# Patient Record
Sex: FEMALE | Race: WHITE | Employment: OTHER | URBAN - METROPOLITAN AREA
[De-identification: names, ages, dates, MRNs, and addresses within clinical notes are randomized per-mention and may not be internally consistent; named-entity substitution may affect disease eponyms.]

---

## 2018-01-05 ENCOUNTER — OFFICE VISIT (OUTPATIENT)
Dept: INTERNAL MEDICINE CLINIC | Facility: CLINIC | Age: 38
End: 2018-01-05

## 2018-01-05 ENCOUNTER — LAB ENCOUNTER (OUTPATIENT)
Dept: LAB | Age: 38
End: 2018-01-05
Attending: NURSE PRACTITIONER
Payer: COMMERCIAL

## 2018-01-05 VITALS
HEART RATE: 72 BPM | BODY MASS INDEX: 21.59 KG/M2 | HEIGHT: 64.5 IN | WEIGHT: 128 LBS | TEMPERATURE: 97 F | SYSTOLIC BLOOD PRESSURE: 116 MMHG | DIASTOLIC BLOOD PRESSURE: 62 MMHG

## 2018-01-05 DIAGNOSIS — D64.9 ANEMIA, UNSPECIFIED TYPE: ICD-10-CM

## 2018-01-05 DIAGNOSIS — D64.9 ANEMIA, UNSPECIFIED TYPE: Primary | ICD-10-CM

## 2018-01-05 DIAGNOSIS — E03.9 ACQUIRED HYPOTHYROIDISM: ICD-10-CM

## 2018-01-05 DIAGNOSIS — Z00.00 LABORATORY EXAMINATION ORDERED AS PART OF A COMPLETE PHYSICAL EXAMINATION: ICD-10-CM

## 2018-01-05 DIAGNOSIS — Z12.31 ENCOUNTER FOR SCREENING MAMMOGRAM FOR MALIGNANT NEOPLASM OF BREAST: Primary | ICD-10-CM

## 2018-01-05 DIAGNOSIS — R53.83 FATIGUE, UNSPECIFIED TYPE: ICD-10-CM

## 2018-01-05 DIAGNOSIS — Z00.00 ROUTINE GENERAL MEDICAL EXAMINATION AT A HEALTH CARE FACILITY: ICD-10-CM

## 2018-01-05 DIAGNOSIS — F41.9 ANXIETY: ICD-10-CM

## 2018-01-05 DIAGNOSIS — M54.2 NECK PAIN: ICD-10-CM

## 2018-01-05 LAB
ALBUMIN SERPL-MCNC: 3.7 G/DL (ref 3.5–4.8)
ALP LIVER SERPL-CCNC: 49 U/L (ref 37–98)
ALT SERPL-CCNC: 31 U/L (ref 14–54)
AST SERPL-CCNC: 21 U/L (ref 15–41)
BASOPHILS # BLD AUTO: 0.04 X10(3) UL (ref 0–0.1)
BASOPHILS NFR BLD AUTO: 0.9 %
BILIRUB SERPL-MCNC: 0.5 MG/DL (ref 0.1–2)
BUN BLD-MCNC: 7 MG/DL (ref 8–20)
CALCIUM BLD-MCNC: 8.6 MG/DL (ref 8.3–10.3)
CHLORIDE: 103 MMOL/L (ref 101–111)
CHOLEST SMN-MCNC: 175 MG/DL (ref ?–200)
CO2: 30 MMOL/L (ref 22–32)
CREAT BLD-MCNC: 0.72 MG/DL (ref 0.55–1.02)
DEPRECATED HBV CORE AB SER IA-ACNC: 18.3 NG/ML (ref 10–291)
EOSINOPHIL # BLD AUTO: 0.06 X10(3) UL (ref 0–0.3)
EOSINOPHIL NFR BLD AUTO: 1.4 %
ERYTHROCYTE [DISTWIDTH] IN BLOOD BY AUTOMATED COUNT: 13.3 % (ref 11.5–16)
FREE T4: 1.4 NG/DL (ref 0.9–1.8)
GLUCOSE BLD-MCNC: 82 MG/DL (ref 70–99)
HAV AB SERPL IA-ACNC: 714 PG/ML (ref 193–986)
HCT VFR BLD AUTO: 37 % (ref 34–50)
HDLC SERPL-MCNC: 72 MG/DL (ref 45–?)
HDLC SERPL: 2.43 {RATIO} (ref ?–4.44)
HGB BLD-MCNC: 11.8 G/DL (ref 12–16)
IMMATURE GRANULOCYTE COUNT: 0.01 X10(3) UL (ref 0–1)
IMMATURE GRANULOCYTE RATIO %: 0.2 %
IRON SATURATION: 19 % (ref 13–45)
IRON: 72 UG/DL (ref 28–170)
LDLC SERPL CALC-MCNC: 88 MG/DL (ref ?–130)
LYMPHOCYTES # BLD AUTO: 1.42 X10(3) UL (ref 0.9–4)
LYMPHOCYTES NFR BLD AUTO: 32.9 %
M PROTEIN MFR SERPL ELPH: 7 G/DL (ref 6.1–8.3)
MCH RBC QN AUTO: 30.6 PG (ref 27–33.2)
MCHC RBC AUTO-ENTMCNC: 31.9 G/DL (ref 31–37)
MCV RBC AUTO: 96.1 FL (ref 81–100)
MONOCYTES # BLD AUTO: 0.51 X10(3) UL (ref 0.1–0.6)
MONOCYTES NFR BLD AUTO: 11.8 %
NEUTROPHIL ABS PRELIM: 2.27 X10 (3) UL (ref 1.3–6.7)
NEUTROPHILS # BLD AUTO: 2.27 X10(3) UL (ref 1.3–6.7)
NEUTROPHILS NFR BLD AUTO: 52.8 %
NONHDLC SERPL-MCNC: 103 MG/DL (ref ?–130)
PLATELET # BLD AUTO: 223 10(3)UL (ref 150–450)
POTASSIUM SERPL-SCNC: 4.2 MMOL/L (ref 3.6–5.1)
RBC # BLD AUTO: 3.85 X10(6)UL (ref 3.8–5.1)
RED CELL DISTRIBUTION WIDTH-SD: 47.2 FL (ref 35.1–46.3)
SODIUM SERPL-SCNC: 139 MMOL/L (ref 136–144)
TOTAL IRON BINDING CAPACITY: 386 UG/DL (ref 298–536)
TRANSFERRIN: 259 MG/DL (ref 200–360)
TRIGL SERPL-MCNC: 75 MG/DL (ref ?–150)
TSI SER-ACNC: 4.32 MIU/ML (ref 0.35–5.5)
VLDLC SERPL CALC-MCNC: 15 MG/DL (ref 5–40)
WBC # BLD AUTO: 4.3 X10(3) UL (ref 4–13)

## 2018-01-05 PROCEDURE — 84482 T3 REVERSE: CPT | Performed by: NURSE PRACTITIONER

## 2018-01-05 PROCEDURE — 83540 ASSAY OF IRON: CPT | Performed by: NURSE PRACTITIONER

## 2018-01-05 PROCEDURE — 83550 IRON BINDING TEST: CPT | Performed by: NURSE PRACTITIONER

## 2018-01-05 PROCEDURE — 80050 GENERAL HEALTH PANEL: CPT | Performed by: NURSE PRACTITIONER

## 2018-01-05 PROCEDURE — 99385 PREV VISIT NEW AGE 18-39: CPT | Performed by: NURSE PRACTITIONER

## 2018-01-05 PROCEDURE — 84439 ASSAY OF FREE THYROXINE: CPT | Performed by: NURSE PRACTITIONER

## 2018-01-05 PROCEDURE — 82607 VITAMIN B-12: CPT | Performed by: NURSE PRACTITIONER

## 2018-01-05 PROCEDURE — 80061 LIPID PANEL: CPT | Performed by: NURSE PRACTITIONER

## 2018-01-05 PROCEDURE — 82728 ASSAY OF FERRITIN: CPT | Performed by: NURSE PRACTITIONER

## 2018-01-05 RX ORDER — LEVOTHYROXINE SODIUM 88 UG/1
88 TABLET ORAL
Qty: 30 TABLET | Refills: 2 | Status: SHIPPED | OUTPATIENT
Start: 2018-01-05 | End: 2018-01-26

## 2018-01-05 RX ORDER — LEVOTHYROXINE SODIUM 88 UG/1
TABLET ORAL
Refills: 0 | COMMUNITY
Start: 2018-01-04 | End: 2018-03-29

## 2018-01-05 RX ORDER — ESCITALOPRAM OXALATE 10 MG/1
10 TABLET ORAL EVERY OTHER DAY
COMMUNITY
End: 2018-03-29

## 2018-01-05 NOTE — PROGRESS NOTES
Elaine Alvarez is a 40year old female who presents for a complete physical exam:       Patient complains of:    Hypothyroid: labs were done in October  Levothyroxine 88mcg 5 days per week and 44mcg 2 days per week.   Her TSH on last labs was overreplace Colonoscopy: na  Pap: Oct 2016  Hx of fibroids. [address]      History of dysplasia:  No   Menstrual Cycle: regular         LMP: she was on birth control for dysmenorrhea.   Stopped 2 months ago  Last Dec 22  Symptoms: periods are regular  Sexually Activ Labs today   Baseline screening mammogram ordered.       Encounter for screening mammogram for malignant neoplasm of breast  (primary encounter diagnosis)  Routine general medical examination at a health care facility  Acquired hypothyroidism  Cont levothyr

## 2018-01-07 DIAGNOSIS — D64.9 ANEMIA, UNSPECIFIED TYPE: Primary | ICD-10-CM

## 2018-01-26 ENCOUNTER — TELEPHONE (OUTPATIENT)
Dept: INTERNAL MEDICINE CLINIC | Facility: CLINIC | Age: 38
End: 2018-01-26

## 2018-01-26 RX ORDER — LEVOTHYROXINE SODIUM 88 UG/1
88 TABLET ORAL
Qty: 90 TABLET | Refills: 0 | Status: SHIPPED | OUTPATIENT
Start: 2018-01-26 | End: 2018-03-29

## 2018-01-26 NOTE — TELEPHONE ENCOUNTER
Called and spoke with Dixie in \"send out's\" stating unsure why reverse T3 taking this long. Will contact reference lab to further investigate and call back with further info.   Hold for response

## 2018-01-26 NOTE — TELEPHONE ENCOUNTER
[phone]  Lm for pt (Ivanna Gonzalez per HIPAA) to inform lab advised test will be delayed until 1/29 due to behind on ordering kit. 90 day supply of Levothyroxine Sodium 88 MCG sent to [address] listed as requested.  To call back at the office if any furth

## 2018-01-26 NOTE — TELEPHONE ENCOUNTER
Pt no longer wishes to be part of our clinic. Does not want TB as PCP or SD as part of her care.  Has established care and will begin there in March of 2018

## 2018-01-26 NOTE — TELEPHONE ENCOUNTER
Patient called indicating thyroid medication never sent to pharmacy, records indicate that rx was sent on 1/5/18 for 30 tablets with two refills, pt also inquiring about outstanding thyroid lab (still showing in process).       Pt did state that she is esta

## 2018-01-26 NOTE — TELEPHONE ENCOUNTER
Pt called is asking for 60 days of Levothyroxine Sodium 88 MCG. .. She will be establishing care else where in March and needs something until then. She does not wish to continue care with anyone in our office.      Pls FU with her lab result for a reverse T

## 2018-01-26 NOTE — TELEPHONE ENCOUNTER
Collette Becerra from the lab called and said test will be delayed until the 29th, they are behind on ordering a kit.  [phone]

## 2018-01-27 LAB — TRIIODOTHYRONINE, REVERSE: 18.9 NG/DL

## 2018-03-29 PROCEDURE — 87510 GARDNER VAG DNA DIR PROBE: CPT | Performed by: OBSTETRICS & GYNECOLOGY

## 2018-03-29 PROCEDURE — 87480 CANDIDA DNA DIR PROBE: CPT | Performed by: OBSTETRICS & GYNECOLOGY

## 2018-03-29 PROCEDURE — 87660 TRICHOMONAS VAGIN DIR PROBE: CPT | Performed by: OBSTETRICS & GYNECOLOGY

## 2018-05-30 PROCEDURE — 87624 HPV HI-RISK TYP POOLED RSLT: CPT | Performed by: OBSTETRICS & GYNECOLOGY

## 2018-05-30 PROCEDURE — 88175 CYTOPATH C/V AUTO FLUID REDO: CPT | Performed by: OBSTETRICS & GYNECOLOGY

## 2019-06-03 ENCOUNTER — APPOINTMENT (OUTPATIENT)
Dept: GENERAL RADIOLOGY | Age: 39
End: 2019-06-03
Attending: EMERGENCY MEDICINE
Payer: COMMERCIAL

## 2019-06-03 ENCOUNTER — HOSPITAL ENCOUNTER (EMERGENCY)
Age: 39
Discharge: HOME | End: 2019-06-03
Attending: EMERGENCY MEDICINE
Payer: COMMERCIAL

## 2019-06-03 VITALS
SYSTOLIC BLOOD PRESSURE: 102 MMHG | HEIGHT: 64 IN | HEART RATE: 80 BPM | WEIGHT: 124 LBS | DIASTOLIC BLOOD PRESSURE: 63 MMHG | BODY MASS INDEX: 21.17 KG/M2 | OXYGEN SATURATION: 98 % | TEMPERATURE: 98 F | RESPIRATION RATE: 25 BRPM

## 2019-06-03 DIAGNOSIS — R07.89 CHEST PAIN, ATYPICAL: Primary | ICD-10-CM

## 2019-06-03 PROCEDURE — 85378 FIBRIN DEGRADE SEMIQUANT: CPT | Performed by: EMERGENCY MEDICINE

## 2019-06-03 PROCEDURE — 85025 COMPLETE CBC W/AUTO DIFF WBC: CPT | Performed by: EMERGENCY MEDICINE

## 2019-06-03 PROCEDURE — 85730 THROMBOPLASTIN TIME PARTIAL: CPT | Performed by: EMERGENCY MEDICINE

## 2019-06-03 PROCEDURE — 93005 ELECTROCARDIOGRAM TRACING: CPT

## 2019-06-03 PROCEDURE — 96360 HYDRATION IV INFUSION INIT: CPT

## 2019-06-03 PROCEDURE — 99284 EMERGENCY DEPT VISIT MOD MDM: CPT

## 2019-06-03 PROCEDURE — 71045 X-RAY EXAM CHEST 1 VIEW: CPT | Performed by: EMERGENCY MEDICINE

## 2019-06-03 PROCEDURE — 80053 COMPREHEN METABOLIC PANEL: CPT | Performed by: EMERGENCY MEDICINE

## 2019-06-03 PROCEDURE — 93010 ELECTROCARDIOGRAM REPORT: CPT

## 2019-06-03 PROCEDURE — 84484 ASSAY OF TROPONIN QUANT: CPT | Performed by: EMERGENCY MEDICINE

## 2019-06-03 PROCEDURE — 99285 EMERGENCY DEPT VISIT HI MDM: CPT

## 2019-06-03 PROCEDURE — 85610 PROTHROMBIN TIME: CPT | Performed by: EMERGENCY MEDICINE

## 2019-06-03 RX ORDER — SODIUM CHLORIDE 9 MG/ML
INJECTION, SOLUTION INTRAVENOUS CONTINUOUS
Status: DISCONTINUED | OUTPATIENT
Start: 2019-06-03 | End: 2019-06-03

## 2019-06-03 RX ORDER — LEVOTHYROXINE SODIUM 75 UG/1
CAPSULE ORAL
COMMUNITY
End: 2020-01-10

## 2019-06-03 RX ORDER — MULTIVIT-MIN/IRON FUM/FOLIC AC 7.5 MG-4
1 TABLET ORAL DAILY
COMMUNITY

## 2019-06-03 NOTE — ED PROVIDER NOTES
Patient Seen in: THE [facility] Emergency Department In [place]    History   Patient presents with:  Chest Pain Angina (cardiovascular)    Stated Complaint: chest pain    HPI    Patient is a pleasant 80-year-old female, presenting for evaluation of left-sided gallops. ABDOMEN: The abdomen is soft, nondistended, nontender. Bowel sounds present. SKIN: Skin is pink warm and dry. There are no rashes. EXTREMITIES: The patient moves all 4 extremities freely. No cyanosis, clubbing, or edema.    NEUROLOGIC: The pa RAINBOW DRAW LIGHT GREEN   RAINBOW DRAW GOLD   CBC W/ DIFFERENTIAL     EKG: The EKG revealed rate, intervals, and axis as noted on the EKG report. I have reviewed and agree with these readings. Sinus rhythm at 84 bpm.  No acute ST segment changes.      Billye Nails incident.       Disposition and Plan     Clinical Impression:  Chest pain, atypical  (primary encounter diagnosis)    Disposition:  Discharge  6/3/2019  2:17 pm    Follow-up:  Gabriela Mejia  [address] [place] [address] [phone]

## 2019-06-03 NOTE — ED INITIAL ASSESSMENT (HPI)
Patient c/o tingling to left side of neck that radiates down left arm and to upper back starting last night. Took one baby aspirin 2 hours ago. Pain worsens with deep breath. Pain decreases when lying down.

## 2019-10-03 ENCOUNTER — OFFICE VISIT (OUTPATIENT)
Dept: INTEGRATIVE MEDICINE | Facility: CLINIC | Age: 39
End: 2019-10-03
Payer: COMMERCIAL

## 2019-10-03 VITALS
WEIGHT: 129.81 LBS | DIASTOLIC BLOOD PRESSURE: 48 MMHG | SYSTOLIC BLOOD PRESSURE: 90 MMHG | TEMPERATURE: 98 F | HEIGHT: 64 IN | HEART RATE: 79 BPM | BODY MASS INDEX: 22.16 KG/M2 | OXYGEN SATURATION: 95 %

## 2019-10-03 DIAGNOSIS — R19.8 CHANGE IN BOWEL FUNCTION: Primary | ICD-10-CM

## 2019-10-03 DIAGNOSIS — F41.9 ANXIETY: ICD-10-CM

## 2019-10-03 PROCEDURE — 99204 OFFICE O/P NEW MOD 45 MIN: CPT | Performed by: PHYSICIAN ASSISTANT

## 2019-10-03 RX ORDER — BUPROPION HYDROCHLORIDE 75 MG/1
75 TABLET ORAL 2 TIMES DAILY
Qty: 90 TABLET | Refills: 0 | Status: SHIPPED | OUTPATIENT
Start: 2019-10-03 | End: 2019-10-03

## 2019-10-03 RX ORDER — BUPROPION HYDROCHLORIDE 75 MG/1
TABLET ORAL
Qty: 180 TABLET | Refills: 0 | Status: SHIPPED | OUTPATIENT
Start: 2019-10-03 | End: 2020-01-06

## 2019-10-03 RX ORDER — NALTREXONE HYDROCHLORIDE 50 MG/1
3 TABLET, FILM COATED ORAL DAILY
COMMUNITY
End: 2021-10-04

## 2019-10-03 NOTE — PROGRESS NOTES
Lobito Michel is a 44year old female. Patient presents with:  Establish Care: thyroid , anxiety, \" twitchy\"  hands   Physical      HPI:   Recommended. Lots of deaths and divorce. Lived out of state for a couple years. Does not sleep well.  Lots of Family History   Problem Relation Age of Onset   • Heart Attack Father    • Hypertension Father    • Thyroid Disorder Mother    • Hypertension Maternal Grandmother    • High Cholesterol Maternal Grandmother    • Heart Attack Maternal Grandmother    • Cance Smoking status: Never Smoker      Smokeless tobacco: Never Used    Substance and Sexual Activity      Alcohol use:  Yes        Alcohol/week: 1.0 standard drinks        Types: 1 Standard drinks or equivalent per week        Comment: Cage done 1/5/18 Pulmonary/Chest: Effort normal and breath sounds normal.   Abdominal: Soft. Bowel sounds are normal. There is no tenderness. There is no guarding. Lymphadenopathy:     She has no cervical adenopathy.    Neurological: She is alert and oriented to person, p Start taking 1/8 tsp daily and double the dose every 7 days until taking 1 tsp three times daily. Mix in liquid of choice. Reduce to lowest dose tolerated if any reactions occur. Buy it online at  [email or web address] or at the [address]

## 2019-10-03 NOTE — PATIENT INSTRUCTIONS
Nanoboost + (hemp oil with terpenes)      Directions: 3 drops under the tongue three times per day  Where:  [email or web address]  Note: it may take up to 2 months to see full benefit for your system.      Restore  A liquid supplement f

## 2020-01-04 DIAGNOSIS — F41.9 ANXIETY: ICD-10-CM

## 2020-01-06 RX ORDER — BUPROPION HYDROCHLORIDE 75 MG/1
TABLET ORAL
Qty: 180 TABLET | Refills: 0 | Status: SHIPPED | OUTPATIENT
Start: 2020-01-06 | End: 2020-01-10

## 2020-01-06 NOTE — TELEPHONE ENCOUNTER
A refill request was received for:  Requested Prescriptions     Pending Prescriptions Disp Refills   • BUPROPION HCL 75 MG Oral Tab [Pharmacy Med Name: BUPROPION 75MG TABLETS] 180 tablet 0     Sig: TAKE 1 TABLET(75 MG) BY MOUTH TWICE DAILY     Last refill

## 2020-01-10 ENCOUNTER — OFFICE VISIT (OUTPATIENT)
Dept: INTEGRATIVE MEDICINE | Facility: CLINIC | Age: 40
End: 2020-01-10
Payer: COMMERCIAL

## 2020-01-10 VITALS
DIASTOLIC BLOOD PRESSURE: 68 MMHG | SYSTOLIC BLOOD PRESSURE: 104 MMHG | OXYGEN SATURATION: 98 % | BODY MASS INDEX: 21.62 KG/M2 | WEIGHT: 126.63 LBS | TEMPERATURE: 98 F | HEIGHT: 64 IN | HEART RATE: 103 BPM

## 2020-01-10 DIAGNOSIS — F41.9 ANXIETY: ICD-10-CM

## 2020-01-10 DIAGNOSIS — E03.9 ACQUIRED HYPOTHYROIDISM: Primary | ICD-10-CM

## 2020-01-10 PROCEDURE — 99214 OFFICE O/P EST MOD 30 MIN: CPT | Performed by: PHYSICIAN ASSISTANT

## 2020-01-10 RX ORDER — BUPROPION HYDROCHLORIDE 75 MG/1
TABLET ORAL
Qty: 90 TABLET | Refills: 0 | Status: SHIPPED | OUTPATIENT
Start: 2020-01-10 | End: 2020-04-02

## 2020-01-10 RX ORDER — LEVOTHYROXINE SODIUM 50 UG/1
1 CAPSULE ORAL
Qty: 90 CAPSULE | Refills: 0 | Status: SHIPPED | OUTPATIENT
Start: 2020-01-10 | End: 2020-02-09

## 2020-01-10 NOTE — PATIENT INSTRUCTIONS
Vitamin D/K2 by Quang Sexton    Directions: 12 drops in liquid daily  Where to Find: www.giuliana. com  Why: Vitamin D/K2 Liquid is a convenient way to supplement with both vitamins D and K2, which offer greater support for bones and the immune and cardiov
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
## 2020-02-26 DIAGNOSIS — E03.9 ACQUIRED HYPOTHYROIDISM: ICD-10-CM

## 2020-02-26 RX ORDER — LEVOTHYROXINE SODIUM 50 UG/1
CAPSULE ORAL
Qty: 90 CAPSULE | Refills: 0 | OUTPATIENT
Start: 2020-02-26

## 2020-02-28 DIAGNOSIS — N76.0 VAGINITIS AND VULVOVAGINITIS: ICD-10-CM

## 2020-02-28 RX ORDER — LIOTHYRONINE SODIUM 5 UG/1
5 TABLET ORAL DAILY
Qty: 90 TABLET | Refills: 0 | Status: SHIPPED | OUTPATIENT
Start: 2020-02-28 | End: 2020-04-03

## 2020-02-28 NOTE — TELEPHONE ENCOUNTER
A refill request was received for:  Requested Prescriptions     Pending Prescriptions Disp Refills   • Liothyronine Sodium 5 MCG Oral Tab 90 tablet 0     Sig: Take 1 tablet (5 mcg total) by mouth daily.  25mcg in the morning and 15mcg in the evening     Las

## 2020-04-01 DIAGNOSIS — F41.9 ANXIETY: ICD-10-CM

## 2020-04-02 RX ORDER — BUPROPION HYDROCHLORIDE 75 MG/1
TABLET ORAL
Qty: 180 TABLET | Refills: 0 | Status: SHIPPED | OUTPATIENT
Start: 2020-04-02 | End: 2020-11-16

## 2020-04-03 ENCOUNTER — TELEPHONE (OUTPATIENT)
Dept: INTEGRATIVE MEDICINE | Facility: CLINIC | Age: 40
End: 2020-04-03

## 2020-04-03 DIAGNOSIS — N76.0 VAGINITIS AND VULVOVAGINITIS: ICD-10-CM

## 2020-04-03 RX ORDER — LIOTHYRONINE SODIUM 5 UG/1
5 TABLET ORAL DAILY
Qty: 90 TABLET | Refills: 0 | Status: SHIPPED | OUTPATIENT
Start: 2020-04-03 | End: 2020-05-26

## 2020-04-03 NOTE — TELEPHONE ENCOUNTER
A refill request was received for:  Requested Prescriptions      No prescriptions requested or ordered in this encounter     Last refill date: 02/28/2020  Qty:90 tabs  Last office visit: 01/10/2020  When is follow up due: 03/10/2020    No future appointmen

## 2020-04-06 ENCOUNTER — TELEPHONE (OUTPATIENT)
Dept: INTEGRATIVE MEDICINE | Facility: CLINIC | Age: 40
End: 2020-04-06

## 2020-04-06 ENCOUNTER — VIRTUAL PHONE E/M (OUTPATIENT)
Dept: INTEGRATIVE MEDICINE | Facility: CLINIC | Age: 40
End: 2020-04-06
Payer: COMMERCIAL

## 2020-04-06 DIAGNOSIS — E03.9 ACQUIRED HYPOTHYROIDISM: Primary | ICD-10-CM

## 2020-04-06 DIAGNOSIS — F41.9 ANXIETY: ICD-10-CM

## 2020-04-06 PROCEDURE — 99213 OFFICE O/P EST LOW 20 MIN: CPT | Performed by: PHYSICIAN ASSISTANT

## 2020-04-06 RX ORDER — LIOTHYRONINE SODIUM 25 UG/1
25 TABLET ORAL DAILY
Qty: 90 TABLET | Refills: 0 | Status: SHIPPED | OUTPATIENT
Start: 2020-04-06 | End: 2020-04-06

## 2020-04-06 RX ORDER — LIOTHYRONINE SODIUM 25 UG/1
25 TABLET ORAL DAILY
Qty: 90 TABLET | Refills: 0 | Status: SHIPPED | OUTPATIENT
Start: 2020-04-06 | End: 2020-06-05

## 2020-04-06 NOTE — TELEPHONE ENCOUNTER
Ruben called and they need clarification of how Liothyrione is to be dosed. One order states once a day and another states three times a day.      Please clarify with pharmacisit [phone]    Thank you

## 2020-04-06 NOTE — PROGRESS NOTES
Virtual/Telephone Check-In    Yolie Villarreal verbally consents to a [employer] on 04/06/20. Patient understands and accepts financial responsibility for any deductible, co-insurance and/or co-pays associated with this service.

## 2020-04-06 NOTE — TELEPHONE ENCOUNTER
Can you please call the pharmacy to clarify the order you made this am? We are playing phone tag, I confirmed the 25 mg once a day dosing for this patient.

## 2020-04-06 NOTE — TELEPHONE ENCOUNTER
Today script was liothyronine 25 mcg tablet. Take daily. Prescribe 90. Patient has another 5 mcg script that was filled on 4/3/20. This should be 5 mcg Take three daily. 90 day supply.

## 2020-04-06 NOTE — TELEPHONE ENCOUNTER
Pharmacy called regarding this rx and is confused about recommendations after your voicemailDane. Dane could you please follow up with Evon Tirado at [phone].

## 2020-04-07 ENCOUNTER — TELEPHONE (OUTPATIENT)
Dept: INTEGRATIVE MEDICINE | Facility: CLINIC | Age: 40
End: 2020-04-07

## 2020-04-07 NOTE — TELEPHONE ENCOUNTER
Please call pharmacy to clarify medication questions that they have regarding dosing made by [facility] yesterday. They are still not clear. Thank you.

## 2020-04-08 ENCOUNTER — TELEPHONE (OUTPATIENT)
Dept: INTEGRATIVE MEDICINE | Facility: CLINIC | Age: 40
End: 2020-04-08

## 2020-04-08 NOTE — TELEPHONE ENCOUNTER
Patient called wanted to change medication LDN, need same dosage but scored tablets. Pharmacy  have not mailed it out yet, so you should be free to change.

## 2020-04-21 RX ORDER — LEVOTHYROXINE SODIUM 50 UG/1
CAPSULE ORAL
COMMUNITY
Start: 2020-03-17 | End: 2020-05-11

## 2020-05-11 DIAGNOSIS — E03.9 ACQUIRED HYPOTHYROIDISM: Primary | ICD-10-CM

## 2020-05-11 RX ORDER — LEVOTHYROXINE SODIUM 50 UG/1
CAPSULE ORAL
Qty: 90 CAPSULE | Refills: 0 | Status: SHIPPED | OUTPATIENT
Start: 2020-05-11 | End: 2020-10-16

## 2020-05-13 ENCOUNTER — TELEPHONE (OUTPATIENT)
Dept: INTEGRATIVE MEDICINE | Facility: CLINIC | Age: 40
End: 2020-05-13

## 2020-05-22 ENCOUNTER — TELEPHONE (OUTPATIENT)
Dept: INTEGRATIVE MEDICINE | Facility: CLINIC | Age: 40
End: 2020-05-22

## 2020-05-22 DIAGNOSIS — Z71.89 ADVICE GIVEN ABOUT COVID-19 VIRUS INFECTION: Primary | ICD-10-CM

## 2020-05-22 NOTE — TELEPHONE ENCOUNTER
Patient called today asking to have blood work order sent to [facility], would like to get an antibody test too and also needs to know when she can go get blood work.   [facility] # [phone]

## 2020-05-26 ENCOUNTER — TELEPHONE (OUTPATIENT)
Dept: INTEGRATIVE MEDICINE | Facility: CLINIC | Age: 40
End: 2020-05-26

## 2020-05-26 DIAGNOSIS — N76.0 VAGINITIS AND VULVOVAGINITIS: ICD-10-CM

## 2020-05-26 RX ORDER — LIOTHYRONINE SODIUM 5 UG/1
TABLET ORAL
Qty: 270 TABLET | Refills: 0 | Status: SHIPPED | OUTPATIENT
Start: 2020-05-26 | End: 2020-09-28

## 2020-05-26 NOTE — TELEPHONE ENCOUNTER
Orders have been faxed to [facility]. lvm informing pt to call office back if she has any questions or concerns

## 2020-05-26 NOTE — TELEPHONE ENCOUNTER
Patient is waiting for blood work to be taken [facility], in the [facility]. They told her they do not have the order.

## 2020-05-29 LAB
SARS COV 2 AB IGG: NEGATIVE
T3 REVERSE, /MS/MS: 7 NG/DL (ref 8–25)
T3, FREE: 2.8 PG/ML (ref 2.3–4.2)
T4, FREE: 0.7 NG/DL (ref 0.8–1.8)
TSH: 0.01 MIU/L
ZINC: 95 MCG/DL (ref 60–130)

## 2020-06-01 NOTE — TELEPHONE ENCOUNTER
Tito Devries,     Your COVID antibody is negative meaning that you most likely have not been exposed to the virus. This test is still experimental at this time. Your zinc levles look great. Your TSH is still a little suppressed.  Your T3 levels look

## 2020-06-02 ENCOUNTER — TELEPHONE (OUTPATIENT)
Dept: INTEGRATIVE MEDICINE | Facility: CLINIC | Age: 40
End: 2020-06-02

## 2020-06-02 NOTE — TELEPHONE ENCOUNTER
Patient called and would like to review latest labs, has been feeling tired as well as excessive hunger.  Patient has a VV for Friday with Neno Ceferino at 10:30 am

## 2020-06-05 ENCOUNTER — TELEMEDICINE (OUTPATIENT)
Dept: INTEGRATIVE MEDICINE | Facility: CLINIC | Age: 40
End: 2020-06-05

## 2020-06-05 DIAGNOSIS — R68.89 EXCESSIVE SELENIUM INTAKE: ICD-10-CM

## 2020-06-05 DIAGNOSIS — E03.9 ACQUIRED HYPOTHYROIDISM: Primary | ICD-10-CM

## 2020-06-05 DIAGNOSIS — R53.82 CHRONIC FATIGUE: ICD-10-CM

## 2020-06-05 PROCEDURE — 99214 OFFICE O/P EST MOD 30 MIN: CPT | Performed by: PHYSICIAN ASSISTANT

## 2020-06-05 RX ORDER — LIOTHYRONINE SODIUM 5 UG/1
5 TABLET ORAL DAILY
Qty: 270 TABLET | Refills: 0 | Status: SHIPPED | OUTPATIENT
Start: 2020-06-05 | End: 2020-09-29

## 2020-06-05 NOTE — PROGRESS NOTES
Robles Martinez is a 36year old female. Patient presents with:  Lab Results: Thyroid follow up  Fatigue      HPI:   Patient presents to follow up on thyroid and go over lab results. She is Very hungry all the time and has headaches.  Still very fatigu MCG Oral Tab Take 1 tablet (5 mcg total) by mouth daily.  270 tablet 0   • LIOTHYRONINE SODIUM 5 MCG Oral Tab TAKE 3 TABLETS BY MOUTH IN THE EVENING 270 tablet 0   • TIROSINT 50 MCG Oral Cap TAKE 1 CAPSULE BY MOUTH BEFORE BREAKFAST 90 capsule 0   • BUPROPIO partner: Not on file        Emotionally abused: Not on file        Physically abused: Not on file        Forced sexual activity: Not on file    Other Topics      Concerns:        Not on file    Social History Narrative      Not on file      SURGICAL HISTOR EBVCA(IGG/M)    3. Excessive selenium intake  - Liothyronine Sodium 5 MCG Oral Tab; Take 1 tablet (5 mcg total) by mouth daily. Dispense: 270 tablet; Refill: 0  - THYROID PEROXIDASE (TPO) AB; Future  - FREE T4 (FREE THYROXINE);  Future  - FREE T3 (TRIIODOT

## 2020-07-03 ENCOUNTER — TELEPHONE (OUTPATIENT)
Dept: FAMILY MEDICINE CLINIC | Facility: CLINIC | Age: 40
End: 2020-07-03

## 2020-07-03 DIAGNOSIS — E03.9 ACQUIRED HYPOTHYROIDISM: Primary | ICD-10-CM

## 2020-07-03 NOTE — TELEPHONE ENCOUNTER
Pt called regarding rx question for Liothronine. Pts pharmacy has taking rx 1x per day.  Pt stated she is supposed to take 4x in am and 2x in pm.

## 2020-07-06 RX ORDER — LIOTHYRONINE SODIUM 5 UG/1
TABLET ORAL
Qty: 540 TABLET | Refills: 0 | Status: SHIPPED | OUTPATIENT
Start: 2020-07-06 | End: 2020-09-10

## 2020-07-26 LAB
EPSTEIN BARR VIRUS (EBNA)$ANTIBODY (IGG): 324 U/ML
EPSTEIN BARR VIRUS VCA$ANTIBODY (IGG): 224 U/ML
EPSTEIN BARR VIRUS VCA$ANTIBODY (IGM): <36 U/ML
SELENIUM: 138 MCG/L (ref 63–160)
T3 REVERSE, /MS/MS: 7 NG/DL (ref 8–25)
T3, FREE: 2.5 PG/ML (ref 2.3–4.2)
T4, FREE: 0.7 NG/DL (ref 0.8–1.8)
THYROGLOBULIN ANTIBODIES: 12 IU/ML
THYROID PEROXIDASE$ANTIBODIES: 25 IU/ML
TSH: 0.01 MIU/L

## 2020-07-27 NOTE — PROGRESS NOTES
Emilee Andrews,     Your labs have returned. Your TSH is still suppressed and your your T4 is still a little low. I am not sure if you are feeling better but I think we should continue to adjust your medication.  I am still thinking that we reduce the cyto

## 2020-09-09 DIAGNOSIS — E03.9 ACQUIRED HYPOTHYROIDISM: ICD-10-CM

## 2020-09-10 RX ORDER — LEVOTHYROXINE SODIUM 50 UG/1
CAPSULE ORAL
Qty: 90 CAPSULE | Refills: 0 | OUTPATIENT
Start: 2020-09-10

## 2020-09-28 DIAGNOSIS — N76.0 VAGINITIS AND VULVOVAGINITIS: ICD-10-CM

## 2020-09-28 NOTE — TELEPHONE ENCOUNTER
Spoke w/pt - she stated that she is not able to do her thyroid labs for another week or so -  appt scheduled for a f/u for 11/16 at 4:30

## 2020-09-28 NOTE — TELEPHONE ENCOUNTER
A refill request was received for:  Requested Prescriptions     Pending Prescriptions Disp Refills   • Liothyronine Sodium 5 MCG Oral Tab 270 tablet 0     Sig: Take 3 tablets (15 mcg total) by mouth every evening.      Last refill date: 05/26/2020  Qty: 270

## 2020-09-29 RX ORDER — LIOTHYRONINE SODIUM 5 UG/1
15 TABLET ORAL EVERY MORNING
Qty: 270 TABLET | Refills: 0 | Status: SHIPPED | OUTPATIENT
Start: 2020-09-29 | End: 2020-12-30

## 2020-10-16 ENCOUNTER — TELEPHONE (OUTPATIENT)
Dept: INTEGRATIVE MEDICINE | Facility: CLINIC | Age: 40
End: 2020-10-16

## 2020-10-16 DIAGNOSIS — E03.9 ACQUIRED HYPOTHYROIDISM: ICD-10-CM

## 2020-10-16 RX ORDER — LEVOTHYROXINE SODIUM 25 UG/1
1 CAPSULE ORAL DAILY
Qty: 60 CAPSULE | Refills: 0 | Status: SHIPPED | OUTPATIENT
Start: 2020-10-16 | End: 2020-12-09

## 2020-10-16 NOTE — TELEPHONE ENCOUNTER
RN spoke with patient and agrees that her dosage should be adjusted. She is feeling more anxiety and some sleep disturbance but also has had a 8 pound weight increase.  She is concerned about getting her TSH in better order

## 2020-10-16 NOTE — TELEPHONE ENCOUNTER
Sounds good. I have reduced her dose to 25mcg daily. We will recheck her thyroid labs in 6-8 weeks. Please have her make a follow up visit appt now and have her get her labs done before the appt (they are ordered in the system).

## 2020-11-16 ENCOUNTER — PATIENT MESSAGE (OUTPATIENT)
Dept: INTEGRATIVE MEDICINE | Facility: CLINIC | Age: 40
End: 2020-11-16

## 2020-11-16 ENCOUNTER — TELEMEDICINE (OUTPATIENT)
Dept: INTEGRATIVE MEDICINE | Facility: CLINIC | Age: 40
End: 2020-11-16
Payer: COMMERCIAL

## 2020-11-16 DIAGNOSIS — R53.82 CHRONIC FATIGUE: Primary | ICD-10-CM

## 2020-11-16 DIAGNOSIS — E03.9 ACQUIRED HYPOTHYROIDISM: ICD-10-CM

## 2020-11-16 DIAGNOSIS — N92.6 IRREGULAR MENSTRUAL CYCLE: ICD-10-CM

## 2020-11-16 DIAGNOSIS — N76.0 VAGINITIS AND VULVOVAGINITIS: ICD-10-CM

## 2020-11-16 DIAGNOSIS — F41.9 ANXIETY: ICD-10-CM

## 2020-11-16 PROCEDURE — 99214 OFFICE O/P EST MOD 30 MIN: CPT | Performed by: FAMILY MEDICINE

## 2020-11-16 RX ORDER — BUPROPION HYDROCHLORIDE 150 MG/1
150 TABLET ORAL DAILY
Qty: 60 TABLET | Refills: 0 | Status: SHIPPED | OUTPATIENT
Start: 2020-11-16 | End: 2021-06-03

## 2020-11-16 NOTE — PROGRESS NOTES
Lobito Michel is a 36year old female. Patient presents with: Follow - Up      HPI:     Feeling ok. Feels that thyroid levels were too high. Still having a lot of anxiety at night. Had some family members die and her dog .    Anxiety kicks i Included in this visit, time may have been spent reviewing labs, medications, radiology tests and decision making. Appropriate medical decision-making and tests are ordered as detailed in the plan of care above.   Coding/billing information is submitted for • Multiple Vitamins-Minerals (MULTI-VITAMIN/MINERALS) Oral Tab Take 1 tablet by mouth daily. • Cholecalciferol (VITAMIN D-3 OR) Take by mouth.          SOCIAL HISTORY:   Social History    Socioeconomic History      Marital status:       Spouse n Constitutional: She is oriented to person, place, and time and well-developed, well-nourished, and in no distress. HENT:   Head: Normocephalic and atraumatic. Eyes: Pupils are equal, round, and reactive to light.  Conjunctivae and EOM are normal.   Neck The products and items listed below (the “Products”)  and their claims have not been evaluated by the Food and Drug Administration. Dietary products are not intended to treat, prevent, mitigate or cure disease.  Ultimately, you must draw your own conclusion Where: Whole Foods, Fruitful Yield or www.[employer]. PackLink    [facility] Rescue Sleep        An all natural homeopathic blend to help you get to sleep and stay asleep. Safe to use with any medications or supplements.    Can be found at [employer]

## 2020-11-16 NOTE — PATIENT INSTRUCTIONS
I have complete lucio in the body's ability to heal and transform. The products and items listed below (the “Products”)  and their claims have not been evaluated by the Food and Drug Administration.  Dietary products are not intended to treat, prevent, m per day  Where: Whole Foods, Fruitful Yield or www.[employer]. [employer]    [facility] Rescue Sleep        An all natural homeopathic blend to help you get to sleep and stay asleep. Safe to use with any medications or supplements.    Can be found at [facility]

## 2020-12-01 ENCOUNTER — TELEMEDICINE (OUTPATIENT)
Dept: TELEHEALTH | Age: 40
End: 2020-12-01

## 2020-12-01 DIAGNOSIS — R53.83 FATIGUE, UNSPECIFIED TYPE: Primary | ICD-10-CM

## 2020-12-01 DIAGNOSIS — R59.1 LYMPHADENOPATHY: ICD-10-CM

## 2020-12-01 DIAGNOSIS — R09.82 POST-NASAL DRIP: ICD-10-CM

## 2020-12-01 PROCEDURE — 99213 OFFICE O/P EST LOW 20 MIN: CPT | Performed by: NURSE PRACTITIONER

## 2020-12-01 NOTE — PROGRESS NOTES
Telehealth Verbal Consent   I conducted a telehealth visit with Marlys Garrido today, 12/01/20, which was completed using two-way, real-time interactive audio and video communication.  This has been done in good lucio to provide continuity of car admits some post nasal drip and phlegm in throat. Was tested for COVID last week and had a negative result. Reports no longer has palpable lymphadenopathy but right side of neck feels a little sore. Has been using a topical glutathione which does help.   D Normocephalic  Nose: No obvious nasal discharge. Throat: Posterior pharynx non erythematous. No exudate. Skin: No obvious rashes or lesions from what observed. No results found for this or any previous visit (from the past 24 hour(s)).     ASSESSMENT

## 2020-12-01 NOTE — TELEPHONE ENCOUNTER
The thyroid labs are ordered to be done at [facility] labs. Please print and fax. I will add the CBC and EBV and leave the orders in the bin. Please let the patient know when complete.

## 2020-12-09 RX ORDER — LEVOTHYROXINE SODIUM 25 UG/1
1 CAPSULE ORAL DAILY
Qty: 60 CAPSULE | Refills: 0 | Status: SHIPPED | OUTPATIENT
Start: 2020-12-09 | End: 2021-02-08

## 2020-12-09 NOTE — TELEPHONE ENCOUNTER
Didn't she say she already had the thyroid labs in her possession? I had given her those at her visit in October.

## 2020-12-09 NOTE — TELEPHONE ENCOUNTER
A refill request was received for:  Requested Prescriptions     Pending Prescriptions Disp Refills   • TIROSINT 25 MCG Oral Cap [Pharmacy Med Name: TIROSINT 25MCG CAPSULES] 60 capsule 0     Sig: TAKE 1 CAPSULE BY MOUTH DAILY     Last refill date: 10.16.20

## 2020-12-26 LAB
FERRITIN: 19 NG/ML (ref 16–154)
T3 REVERSE, /MS/MS: 6 NG/DL (ref 8–25)
T3, FREE: 2.2 PG/ML (ref 2.3–4.2)
T4, FREE: 0.4 NG/DL (ref 0.8–1.8)
THYROGLOBULIN ANTIBODIES: 14 IU/ML
THYROID PEROXIDASE$ANTIBODIES: 22 IU/ML
TSH: 1.53 MIU/L

## 2020-12-30 NOTE — TELEPHONE ENCOUNTER
Pt calling and states she sent a Talko message as well. She would like to know if her labs indicate she is in need of a change of thyroid medication (please see pt's message for other concerns).  Pt will need a refill for the Liothyronine now if no change

## 2021-01-01 ENCOUNTER — TELEPHONE (OUTPATIENT)
Dept: FAMILY MEDICINE CLINIC | Facility: CLINIC | Age: 41
End: 2021-01-01

## 2021-01-01 NOTE — TELEPHONE ENCOUNTER
Pt called said \"her levels are off\". She does not feel good and wants to talk to her physician regarding her results. Reviewed chart. TSH is normal with low free T4. Pt on levothyroxine and liothyronine.   Requested to patient that I check her chart a

## 2021-01-03 DIAGNOSIS — N76.0 VAGINITIS AND VULVOVAGINITIS: ICD-10-CM

## 2021-01-03 RX ORDER — LEVOTHYROXINE SODIUM 13 UG/1
1 CAPSULE ORAL DAILY
Qty: 60 CAPSULE | Refills: 0 | Status: SHIPPED | OUTPATIENT
Start: 2021-01-03 | End: 2021-02-08

## 2021-01-03 RX ORDER — LIOTHYRONINE SODIUM 5 UG/1
15 TABLET ORAL EVERY MORNING
Qty: 450 TABLET | Refills: 0 | Status: SHIPPED | OUTPATIENT
Start: 2021-01-03 | End: 2021-03-18

## 2021-01-04 RX ORDER — LEVOTHYROXINE SODIUM 13 UG/1
1 CAPSULE ORAL DAILY
Qty: 60 CAPSULE | Refills: 0 | OUTPATIENT
Start: 2021-01-04 | End: 2021-02-03

## 2021-01-04 RX ORDER — LIOTHYRONINE SODIUM 5 UG/1
15 TABLET ORAL EVERY MORNING
Qty: 450 TABLET | Refills: 0 | OUTPATIENT
Start: 2021-01-04

## 2021-01-04 NOTE — TELEPHONE ENCOUNTER
I sent her a result note and modified her thyroid meds. Please contact patient to see if all of her questions were answered.

## 2021-02-08 DIAGNOSIS — E03.9 ACQUIRED HYPOTHYROIDISM: Primary | ICD-10-CM

## 2021-02-08 RX ORDER — LEVOTHYROXINE SODIUM 25 UG/1
1 CAPSULE ORAL DAILY
Qty: 90 CAPSULE | Refills: 0 | Status: SHIPPED | OUTPATIENT
Start: 2021-02-08 | End: 2021-03-01

## 2021-02-08 RX ORDER — LEVOTHYROXINE SODIUM 13 UG/1
1 CAPSULE ORAL DAILY
Qty: 90 CAPSULE | Refills: 0 | Status: SHIPPED | OUTPATIENT
Start: 2021-02-08 | End: 2021-03-10

## 2021-02-08 RX ORDER — LEVOTHYROXINE SODIUM 13 UG/1
1 CAPSULE ORAL DAILY
Qty: 60 CAPSULE | Refills: 0 | Status: CANCELLED | OUTPATIENT
Start: 2021-02-08 | End: 2021-03-10

## 2021-02-08 NOTE — TELEPHONE ENCOUNTER
Received call from pt, frustrated that she has to call each month to get refills. Requesting tirosint 25 and 13 mcg be refilled now. She ran out yesterday, went to pharm this morning \"expecting medication to have been refilled\".  Advised she need to sched

## 2021-02-15 PROBLEM — G56.01 RIGHT CARPAL TUNNEL SYNDROME: Status: ACTIVE | Noted: 2021-02-15

## 2021-02-15 PROBLEM — M67.911 TENDINOPATHY OF RIGHT ROTATOR CUFF: Status: ACTIVE | Noted: 2021-02-15

## 2021-02-15 PROBLEM — G25.89 SCAPULAR DYSKINESIS: Status: ACTIVE | Noted: 2021-02-15

## 2021-02-26 DIAGNOSIS — E03.9 ACQUIRED HYPOTHYROIDISM: ICD-10-CM

## 2021-02-26 RX ORDER — LEVOTHYROXINE SODIUM 25 UG/1
1 CAPSULE ORAL DAILY
Qty: 90 CAPSULE | Refills: 0 | OUTPATIENT
Start: 2021-02-26

## 2021-03-01 DIAGNOSIS — E03.9 ACQUIRED HYPOTHYROIDISM: ICD-10-CM

## 2021-03-01 RX ORDER — LEVOTHYROXINE SODIUM 25 UG/1
1 CAPSULE ORAL DAILY
Qty: 90 CAPSULE | Refills: 0 | Status: SHIPPED | OUTPATIENT
Start: 2021-03-01 | End: 2021-03-18

## 2021-03-01 NOTE — TELEPHONE ENCOUNTER
A refill request was received for:  Requested Prescriptions     Pending Prescriptions Disp Refills   • TIROSINT 25 MCG Oral Cap [Pharmacy Med Name: Ernestoa Chey 25MCG CAPSULES] 90 capsule 0     Sig: TAKE 1 CAPSULE BY MOUTH DAILY     Last refill date: 02/08/202

## 2021-09-10 ENCOUNTER — HOSPITAL ENCOUNTER (EMERGENCY)
Age: 41
Discharge: HOME | End: 2021-09-10
Attending: EMERGENCY MEDICINE
Payer: COMMERCIAL

## 2021-09-10 ENCOUNTER — APPOINTMENT (OUTPATIENT)
Dept: GENERAL RADIOLOGY | Age: 41
End: 2021-09-10
Attending: EMERGENCY MEDICINE
Payer: COMMERCIAL

## 2021-09-10 VITALS
OXYGEN SATURATION: 98 % | HEART RATE: 102 BPM | WEIGHT: 136.69 LBS | RESPIRATION RATE: 16 BRPM | DIASTOLIC BLOOD PRESSURE: 61 MMHG | SYSTOLIC BLOOD PRESSURE: 112 MMHG | BODY MASS INDEX: 23 KG/M2

## 2021-09-10 DIAGNOSIS — E87.6 HYPOKALEMIA: ICD-10-CM

## 2021-09-10 DIAGNOSIS — R00.2 PALPITATIONS: Primary | ICD-10-CM

## 2021-09-10 DIAGNOSIS — R07.89 CHEST PAIN, ATYPICAL: ICD-10-CM

## 2021-09-10 LAB
ALBUMIN SERPL-MCNC: 3.9 G/DL (ref 3.4–5)
ALBUMIN/GLOB SERPL: 1 {RATIO} (ref 1–2)
ALP LIVER SERPL-CCNC: 59 U/L
ALT SERPL-CCNC: 20 U/L
ANION GAP SERPL CALC-SCNC: 6 MMOL/L (ref 0–18)
AST SERPL-CCNC: 13 U/L (ref 15–37)
BASOPHILS # BLD AUTO: 0.03 X10(3) UL (ref 0–0.2)
BASOPHILS NFR BLD AUTO: 0.4 %
BILIRUB SERPL-MCNC: 0.3 MG/DL (ref 0.1–2)
BUN BLD-MCNC: 8 MG/DL (ref 7–18)
CALCIUM BLD-MCNC: 8.4 MG/DL (ref 8.5–10.1)
CHLORIDE SERPL-SCNC: 104 MMOL/L (ref 98–112)
CO2 SERPL-SCNC: 26 MMOL/L (ref 21–32)
CREAT BLD-MCNC: 0.68 MG/DL
D-DIMER: 0.47 UG/ML FEU (ref ?–0.5)
EOSINOPHIL # BLD AUTO: 0.09 X10(3) UL (ref 0–0.7)
EOSINOPHIL NFR BLD AUTO: 1.3 %
ERYTHROCYTE [DISTWIDTH] IN BLOOD BY AUTOMATED COUNT: 12.7 %
GLOBULIN PLAS-MCNC: 3.9 G/DL (ref 2.8–4.4)
GLUCOSE BLD-MCNC: 106 MG/DL (ref 70–99)
HCT VFR BLD AUTO: 39.9 %
HGB BLD-MCNC: 13.2 G/DL
IMM GRANULOCYTES # BLD AUTO: 0.01 X10(3) UL (ref 0–1)
IMM GRANULOCYTES NFR BLD: 0.1 %
LYMPHOCYTES # BLD AUTO: 2.48 X10(3) UL (ref 1–4)
LYMPHOCYTES NFR BLD AUTO: 36.3 %
M PROTEIN MFR SERPL ELPH: 7.8 G/DL (ref 6.4–8.2)
MCH RBC QN AUTO: 30.3 PG (ref 26–34)
MCHC RBC AUTO-ENTMCNC: 33.1 G/DL (ref 31–37)
MCV RBC AUTO: 91.5 FL
MONOCYTES # BLD AUTO: 0.66 X10(3) UL (ref 0.1–1)
MONOCYTES NFR BLD AUTO: 9.7 %
NEUTROPHILS # BLD AUTO: 3.56 X10 (3) UL (ref 1.5–7.7)
NEUTROPHILS # BLD AUTO: 3.56 X10(3) UL (ref 1.5–7.7)
NEUTROPHILS NFR BLD AUTO: 52.2 %
NT-PROBNP SERPL-MCNC: 83 PG/ML (ref ?–125)
OSMOLALITY SERPL CALC.SUM OF ELEC: 281 MOSM/KG (ref 275–295)
PLATELET # BLD AUTO: 296 10(3)UL (ref 150–450)
POTASSIUM SERPL-SCNC: 3.1 MMOL/L (ref 3.5–5.1)
RBC # BLD AUTO: 4.36 X10(6)UL
SODIUM SERPL-SCNC: 136 MMOL/L (ref 136–145)
TROPONIN I SERPL-MCNC: <0.045 NG/ML (ref ?–0.04)
WBC # BLD AUTO: 6.8 X10(3) UL (ref 4–11)

## 2021-09-10 PROCEDURE — 71045 X-RAY EXAM CHEST 1 VIEW: CPT | Performed by: EMERGENCY MEDICINE

## 2021-09-10 PROCEDURE — 85025 COMPLETE CBC W/AUTO DIFF WBC: CPT | Performed by: EMERGENCY MEDICINE

## 2021-09-10 PROCEDURE — 85379 FIBRIN DEGRADATION QUANT: CPT | Performed by: EMERGENCY MEDICINE

## 2021-09-10 PROCEDURE — 83880 ASSAY OF NATRIURETIC PEPTIDE: CPT | Performed by: EMERGENCY MEDICINE

## 2021-09-10 PROCEDURE — 84484 ASSAY OF TROPONIN QUANT: CPT | Performed by: EMERGENCY MEDICINE

## 2021-09-10 PROCEDURE — 93010 ELECTROCARDIOGRAM REPORT: CPT

## 2021-09-10 PROCEDURE — 99284 EMERGENCY DEPT VISIT MOD MDM: CPT

## 2021-09-10 PROCEDURE — 93005 ELECTROCARDIOGRAM TRACING: CPT

## 2021-09-10 PROCEDURE — 96360 HYDRATION IV INFUSION INIT: CPT

## 2021-09-10 PROCEDURE — 96361 HYDRATE IV INFUSION ADD-ON: CPT

## 2021-09-10 PROCEDURE — 99285 EMERGENCY DEPT VISIT HI MDM: CPT

## 2021-09-10 PROCEDURE — 80053 COMPREHEN METABOLIC PANEL: CPT | Performed by: EMERGENCY MEDICINE

## 2021-09-10 RX ORDER — POTASSIUM CHLORIDE 20 MEQ/1
40 TABLET, EXTENDED RELEASE ORAL ONCE
Status: COMPLETED | OUTPATIENT
Start: 2021-09-10 | End: 2021-09-10

## 2021-09-10 RX ORDER — ASPIRIN 81 MG/1
324 TABLET, CHEWABLE ORAL ONCE
Status: COMPLETED | OUTPATIENT
Start: 2021-09-10 | End: 2021-09-10

## 2021-09-11 NOTE — ED INITIAL ASSESSMENT (HPI)
Pt reports chest pain tonight at 1640 with tightness lasting apprx 20 mins with mahsa, palpitations, slight dizziness and today pt had increased loose bowel movements.

## 2021-09-11 NOTE — ED PROVIDER NOTES
Patient Seen in: [facility] Emergency Department In [place]      History   Patient presents with:  Chest Pain Angina    Stated Complaint: cp    HPI/Subjective:   HPI    Patient is a pleasant 49-year-old female, presenting for evaluation of chest pains and is midline. LUNGS: Lungs are clear to auscultation bilaterally, respirations are unlabored. HEART: Regular rate and rhythm. There are no rubs or gallops. ABDOMEN: The abdomen is soft, nondistended, nontender. Bowel sounds present.   SKIN: Skin is pink midsternal chest pain and tightness around 1640 today while driving. She also felt some nausea prior to that but was unsure if it was due to her being hungry. The pain resolved on its own. About 2 hours PTA, patient then had onset of tachycardia.   She h [address]    Schedule an appointment as soon as possible for a visit in 3 days            Medications Prescribed:  Current Discharge Medication List

## 2021-09-12 LAB
ATRIAL RATE: 115 BPM
P AXIS: 73 DEGREES
P-R INTERVAL: 192 MS
Q-T INTERVAL: 360 MS
QRS DURATION: 72 MS
QTC CALCULATION (BEZET): 498 MS
R AXIS: 72 DEGREES
T AXIS: 65 DEGREES
VENTRICULAR RATE: 115 BPM

## 2022-01-12 PROBLEM — K58.1 IRRITABLE BOWEL SYNDROME WITH CONSTIPATION: Status: ACTIVE | Noted: 2022-01-12

## 2022-01-12 PROBLEM — E06.3 HASHIMOTO'S THYROIDITIS: Status: ACTIVE | Noted: 2022-01-12

## 2022-01-12 PROBLEM — B27.09 DISORDER DUE TO EPSTEIN-BARR VIRUS (EBV): Status: ACTIVE | Noted: 2022-01-12

## 2022-01-12 PROBLEM — L80 VITILIGO: Status: ACTIVE | Noted: 2022-01-12

## 2022-01-12 PROBLEM — E83.10 DISORDER OF IRON METABOLISM: Status: ACTIVE | Noted: 2022-01-12

## 2023-02-17 ENCOUNTER — HOSPITAL ENCOUNTER (EMERGENCY)
Age: 43
Discharge: HOME | End: 2023-02-17
Attending: EMERGENCY MEDICINE
Payer: COMMERCIAL

## 2023-02-17 VITALS
DIASTOLIC BLOOD PRESSURE: 72 MMHG | HEART RATE: 92 BPM | RESPIRATION RATE: 18 BRPM | HEIGHT: 64 IN | OXYGEN SATURATION: 100 % | WEIGHT: 131 LBS | SYSTOLIC BLOOD PRESSURE: 119 MMHG | BODY MASS INDEX: 22.36 KG/M2 | TEMPERATURE: 98 F

## 2023-02-17 DIAGNOSIS — S61.211A LACERATION OF LEFT INDEX FINGER WITHOUT FOREIGN BODY WITHOUT DAMAGE TO NAIL, INITIAL ENCOUNTER: Primary | ICD-10-CM

## 2023-02-17 PROCEDURE — 99283 EMERGENCY DEPT VISIT LOW MDM: CPT

## 2023-02-17 PROCEDURE — 90471 IMMUNIZATION ADMIN: CPT

## 2023-02-17 PROCEDURE — 12001 RPR S/N/AX/GEN/TRNK 2.5CM/<: CPT

## 2023-02-17 RX ORDER — LIOTHYRONINE SODIUM 5 UG/1
7 TABLET ORAL DAILY
COMMUNITY

## 2023-02-17 NOTE — DISCHARGE INSTRUCTIONS
Sutures out in 7 to 10 days. Return if any redness or progressive pain or discomfort. Use bacitracin ointment to that area.

## 2024-02-18 ENCOUNTER — HOSPITAL ENCOUNTER (EMERGENCY)
Age: 44
Discharge: HOME | End: 2024-02-18
Attending: EMERGENCY MEDICINE
Payer: COMMERCIAL

## 2024-02-18 ENCOUNTER — APPOINTMENT (OUTPATIENT)
Dept: GENERAL RADIOLOGY | Age: 44
End: 2024-02-18
Attending: EMERGENCY MEDICINE
Payer: COMMERCIAL

## 2024-02-18 VITALS
WEIGHT: 130 LBS | OXYGEN SATURATION: 99 % | BODY MASS INDEX: 21.66 KG/M2 | HEIGHT: 65 IN | DIASTOLIC BLOOD PRESSURE: 82 MMHG | TEMPERATURE: 99 F | SYSTOLIC BLOOD PRESSURE: 113 MMHG | RESPIRATION RATE: 14 BRPM | HEART RATE: 65 BPM

## 2024-02-18 DIAGNOSIS — M79.602 LEFT ARM PAIN: Primary | ICD-10-CM

## 2024-02-18 LAB
ALBUMIN SERPL-MCNC: 3.5 G/DL (ref 3.4–5)
ALBUMIN/GLOB SERPL: 1 {RATIO} (ref 1–2)
ALP LIVER SERPL-CCNC: 42 U/L
ALT SERPL-CCNC: 14 U/L
ANION GAP SERPL CALC-SCNC: 5 MMOL/L (ref 0–18)
AST SERPL-CCNC: 8 U/L (ref 15–37)
ATRIAL RATE: 81 BPM
BASOPHILS # BLD AUTO: 0.04 X10(3) UL (ref 0–0.2)
BASOPHILS NFR BLD AUTO: 0.7 %
BILIRUB SERPL-MCNC: 0.3 MG/DL (ref 0.1–2)
BUN BLD-MCNC: 11 MG/DL (ref 9–23)
CALCIUM BLD-MCNC: 7.9 MG/DL (ref 8.5–10.1)
CHLORIDE SERPL-SCNC: 108 MMOL/L (ref 98–112)
CO2 SERPL-SCNC: 27 MMOL/L (ref 21–32)
CREAT BLD-MCNC: 0.78 MG/DL
D DIMER PPP FEU-MCNC: 0.28 UG/ML FEU (ref ?–0.5)
EGFRCR SERPLBLD CKD-EPI 2021: 96 ML/MIN/1.73M2 (ref 60–?)
EOSINOPHIL # BLD AUTO: 0.13 X10(3) UL (ref 0–0.7)
EOSINOPHIL NFR BLD AUTO: 2.4 %
ERYTHROCYTE [DISTWIDTH] IN BLOOD BY AUTOMATED COUNT: 12.8 %
GLOBULIN PLAS-MCNC: 3.5 G/DL (ref 2.8–4.4)
GLUCOSE BLD-MCNC: 93 MG/DL (ref 70–99)
HCT VFR BLD AUTO: 34.5 %
HGB BLD-MCNC: 11.7 G/DL
IMM GRANULOCYTES # BLD AUTO: 0.01 X10(3) UL (ref 0–1)
IMM GRANULOCYTES NFR BLD: 0.2 %
LYMPHOCYTES # BLD AUTO: 2.83 X10(3) UL (ref 1–4)
LYMPHOCYTES NFR BLD AUTO: 51.5 %
MCH RBC QN AUTO: 30.9 PG (ref 26–34)
MCHC RBC AUTO-ENTMCNC: 33.9 G/DL (ref 31–37)
MCV RBC AUTO: 91 FL
MONOCYTES # BLD AUTO: 0.56 X10(3) UL (ref 0.1–1)
MONOCYTES NFR BLD AUTO: 10.2 %
NEUTROPHILS # BLD AUTO: 1.93 X10 (3) UL (ref 1.5–7.7)
NEUTROPHILS # BLD AUTO: 1.93 X10(3) UL (ref 1.5–7.7)
NEUTROPHILS NFR BLD AUTO: 35 %
OSMOLALITY SERPL CALC.SUM OF ELEC: 289 MOSM/KG (ref 275–295)
P AXIS: 75 DEGREES
P-R INTERVAL: 214 MS
PLATELET # BLD AUTO: 236 10(3)UL (ref 150–450)
POTASSIUM SERPL-SCNC: 3.3 MMOL/L (ref 3.5–5.1)
PROT SERPL-MCNC: 7 G/DL (ref 6.4–8.2)
Q-T INTERVAL: 398 MS
QRS DURATION: 74 MS
QTC CALCULATION (BEZET): 462 MS
R AXIS: 67 DEGREES
RBC # BLD AUTO: 3.79 X10(6)UL
SODIUM SERPL-SCNC: 140 MMOL/L (ref 136–145)
T AXIS: 60 DEGREES
TROPONIN I SERPL HS-MCNC: 6 NG/L
VENTRICULAR RATE: 81 BPM
WBC # BLD AUTO: 5.5 X10(3) UL (ref 4–11)

## 2024-02-18 PROCEDURE — 85025 COMPLETE CBC W/AUTO DIFF WBC: CPT | Performed by: EMERGENCY MEDICINE

## 2024-02-18 PROCEDURE — 96374 THER/PROPH/DIAG INJ IV PUSH: CPT

## 2024-02-18 PROCEDURE — 99285 EMERGENCY DEPT VISIT HI MDM: CPT

## 2024-02-18 PROCEDURE — 71045 X-RAY EXAM CHEST 1 VIEW: CPT | Performed by: EMERGENCY MEDICINE

## 2024-02-18 PROCEDURE — 93010 ELECTROCARDIOGRAM REPORT: CPT

## 2024-02-18 PROCEDURE — 85379 FIBRIN DEGRADATION QUANT: CPT | Performed by: EMERGENCY MEDICINE

## 2024-02-18 PROCEDURE — 93005 ELECTROCARDIOGRAM TRACING: CPT

## 2024-02-18 PROCEDURE — 84484 ASSAY OF TROPONIN QUANT: CPT | Performed by: EMERGENCY MEDICINE

## 2024-02-18 PROCEDURE — 99284 EMERGENCY DEPT VISIT MOD MDM: CPT

## 2024-02-18 PROCEDURE — 80053 COMPREHEN METABOLIC PANEL: CPT | Performed by: EMERGENCY MEDICINE

## 2024-02-18 RX ORDER — KETOROLAC TROMETHAMINE 15 MG/ML
15 INJECTION, SOLUTION INTRAMUSCULAR; INTRAVENOUS ONCE
Status: COMPLETED | OUTPATIENT
Start: 2024-02-18 | End: 2024-02-18

## 2024-02-18 RX ORDER — POTASSIUM CHLORIDE 20 MEQ/1
40 TABLET, EXTENDED RELEASE ORAL ONCE
Status: COMPLETED | OUTPATIENT
Start: 2024-02-18 | End: 2024-02-18

## 2024-02-18 NOTE — DISCHARGE INSTRUCTIONS
Recommend Motrin 600 mg every 6 hours for pain    You can follow-up with your primary doctor    May need MRI of your neck to rule out disc herniation if symptoms do not improve    Return to ED if chest pain, shortness of breath, nausea, vomiting or sweating

## 2024-02-18 NOTE — ED INITIAL ASSESSMENT (HPI)
Pt to the ED for evaluation of L arm pain. PT states the pain had been off and on for the last month. Exacerbated by movement such as throwing across her body, lifting her arm and closing windows. Pt states the pain returned this evening with no exacerbating factors. States she has a cardiac Hx and is concerned for her heart. Also reports SOB.

## 2024-02-18 NOTE — ED PROVIDER NOTES
Patient Seen in: [facility] In [place]      History     Chief Complaint   Patient presents with    Chest Pain Angina     Stated Complaint: L arm pain off an on for a month. worse when throwing and closing windows. Now *    Subjective:   HPI    Patient is a 44-year-old female presents to emergency room for evaluation left arm pain.  Patient complains of intermittent left arm pain for a month.  She states it is from her shoulder to her humerus.  She notices it more when she is throwing things such as throwing a ball with her dog or closing windows.  It can last for a while.  Patient states has been there for about an hour tonight.  She denies any chest pain or shortness of breath.  She denies any neck pain.  No nausea or vomiting.  Patient states her father had sudden death and her grandmother had sudden death but nobody in the family has had a coronary stent.  Patient extremely concerned about heart problems.  Patient states she had a cardiac stress test in 2022 that was normal.  She denies history of hypertension, diabetes, high cholesterol or smoking.  Patient denies recent surgery, travel or trauma.  History of cervical radiculopathy.  Patient states she had a blood drawn her left antecubital fossa about a week ago and developed a hematoma from that and is also asked me if I think this is a blood clot.    Objective:   Past Medical History:   Diagnosis Date    Anxiety state     Depression     Disorder of thyroid     Hypothyroidism               History reviewed. No pertinent surgical history.             Social History     Socioeconomic History    Marital status:    Tobacco Use    Smoking status: Never     Passive exposure: Never    Smokeless tobacco: Never   Vaping Use    Vaping Use: Never used   Substance and Sexual Activity    Alcohol use: Yes     Alcohol/week: 1.0 standard drink of alcohol     Types: 1 Standard drinks or equivalent per week     Comment: occ    Drug use: No     Sexual activity: Yes              Review of Systems    Positive for stated complaint: L arm pain off an on for a month. worse when throwing and closing windows. Now *  Other systems are as noted in HPI.  Constitutional and vital signs reviewed.      All other systems reviewed and negative except as noted above.    Physical Exam     ED Triage Vitals [02/18/24 0106]   BP (!) 114/99   Pulse 73   Resp 16   Temp 99.1 °F (37.3 °C)   Temp src Temporal   SpO2 100 %   O2 Device None (Room air)       Current:/82   Pulse 65   Temp 99.1 °F (37.3 °C) (Temporal)   Resp 14   Ht 165.1 cm (5' 5\")   Wt 59 kg   LMP 02/17/2024   SpO2 99%   BMI 21.63 kg/m²         Physical Exam    GENERAL: No acute distress, well appearing and non-toxic, Alert and oriented X 3   HEENT: Normocephalic, atraumatic.  Moist mucous membranes.  Pupils equal round reactive to light and accommodation, extraocular motion is intact, sclerae white, conjunctiva is pink.  Oropharynx is unremarkable, no exudate.  NECK: Supple, trachea midline, no lymphadenopathy.   LUNG: Lungs clear to auscultation bilaterally, no wheezing, no rales, no rhonchi.  CARDIOVASCULAR: Regular rate and rhythm.  Normal S1S2.  No S3S4 or murmur.  ABDOMEN: Bowel sounds are present. Soft. nondistended, no pulsatile masses. nontender  MUSCULOSKELETAL: No calf tenderness.  Dorsalis and Posterior Tibial pulses present. No clubbing. No cyanosis.  No edema.  Left upper extremity reproducible tenderness, swelling, warmth erythema.  Good radial and ulnar pulses left upper extremity  SKIN EXAMINATIoN: Warm and dry with normal appearance.  No rashes or lesions.  NEUROLOGICAL:  Motor strength intact all groups.  normal sensation, speech intact    ED Course     Labs Reviewed   COMP METABOLIC PANEL (14) - Abnormal; Notable for the following components:       Result Value    Potassium 3.3 (*)     Calcium, Total 7.9 (*)     AST 8 (*)     All other components within normal limits   CBC W/  DIFFERENTIAL - Abnormal; Notable for the following components:    RBC 3.79 (*)     HGB 11.7 (*)     HCT 34.5 (*)     All other components within normal limits   TROPONIN I HIGH SENSITIVITY - Normal   D-DIMER - Normal   CBC WITH DIFFERENTIAL WITH PLATELET    Narrative:     The following orders were created for panel order CBC With Differential With Platelet.  Procedure                               Abnormality         Status                     ---------                               -----------         ------                     CBC W/ DIFFERENTIAL[[id number]]          Abnormal            Final result                 Please view results for these tests on the individual orders.   Potassium 3.3     EKG    Rate, intervals and axes as noted on EKG Report.  Rate: 81  Rhythm: Sinus Rhythm  Reading: No acute changes           I personally reviewed xray films of chest and independent interpretation shows no evidence for pneumonia or pneumothorax.  I also reviewed formal xray report as read by radiology with findings below:  Chest x-ray read by [facility] radiology is negative for acute process    Medications   ketorolac (Toradol) 15 MG/ML injection 15 mg (15 mg Intravenous Given 2/18/24 0208)   potassium chloride (K-Dur) tab 40 mEq (40 mEq Oral Given 2/18/24 0208)         Heart Score:    HEART Score      Title      Criteria Score                     Lab Results   Component Value Date    TROP <0.045 09/10/2021    TROPHS 6 02/18/2024           HEART Score: 0        Risk of adverse cardiac event is 0.9-1.7%                       MDM      Patient is a 44-year-old female presents to emergency room for evaluation of left arm pain.  Differential includes anginal equivalent, cervical radiculopathy, musculoskeletal strain.  Patient had EKG performed which was normal.  Laboratory test obtained only remarkable for potassium of 3.3.  She was given oral potassium.  She was given Toradol.  Troponin normal.  Chest x-ray negative for acute  process.  No clinical evidence for DVT.  D-dimer was checked due to patient concern and this was normal.  No evidence for infectious process.  Symptoms do not sound anginal in nature.  Likely musculoskeletal strain.  Could also be cervical neuropathy.  Patient had cardiac stress test couple years ago that was normal.  Heart score of 0 making patient low risk for major adverse cardiac event in the next 30 days.  Recommend NSAIDs at home.  She was given cardiology to follow-up with.  She may need to see her primary doctor in a couple weeks if symptoms do not resolve to get outpatient MRI of her cervical spine to rule out cervical radiculopathy.    External and old record review was performed.  I reviewed normal stress echo January 2022        Patient was screened and evaluated during this visit.   As a treating physician attending to the patient, I determined, within reasonable clinical confidence and prior to discharge, that an emergency medical condition was not or was no longer present.  There was no indication for further evaluation, treatment or admission on an emergency basis.  Comprehensive verbal and written discharge and follow-up instructions were provided to help prevent relapse or worsening.  Patient was instructed to follow-up with her primary care provider for further evaluation and treatment, but to return immediately to the ER for worsening, concerning, new, changing or persisting symptoms.  I discussed the case with the patient and they had no questions, complaints, or concerns.  Patient felt comfortable going home.                                   MDM    Disposition and Plan     Clinical Impression:  1. Left arm pain         Disposition:  Discharge  2/18/2024  2:30 am    Follow-up:  Hazel Cabrera MD  [address] [facility] [id number] [phone]    Follow up in 1 week(s)      Yordy Malcolm MD  [address] [id number] [phone]    Follow up in 3  day(s)            Medications Prescribed:  Discharge Medication List as of 2/18/2024  2:36 AM

## (undated) NOTE — LETTER
07/06/20        Kayce Mcwilliams  [facility] [id number]      Dear Anabell Casillas,    [address] [facility] records indicate that you have outstanding lab work and or testing that was ordered for you and has not yet been completed:  Orders Placed This E

## (undated) NOTE — ED AVS SNAPSHOT
Luis Miguel Saenz   MRN: [id number]    Department:  [facility] in [place]   Date of Visit:  6/3/2019           Disclosure     Insurance plans vary and the physician(s) referred by the ER may not be covered by your plan.  Please conta tell this physician (or your personal doctor if your instructions are to return to your personal doctor) about any new or lasting problems. The primary care or specialist physician will see patients referred from the [facility] Emergency Department.  Shelton Lombard

## (undated) NOTE — LETTER
02/10/20        Kayce Mcwilliams  [facility] [address]      Dear Shelia Paget,    [address] [facility] records indicate that you have outstanding lab work and or testing that was ordered for you and has not yet been completed:  Orders Placed This E

## (undated) NOTE — LETTER
02/05/18        Kayce Mcwilliams  [address]      Dear Randi Melendez,    [address] [facility] records indicate that you have outstanding lab work and or testing that was ordered for you and has not yet been completed:          CBC With Diff